# Patient Record
Sex: MALE | Race: WHITE
[De-identification: names, ages, dates, MRNs, and addresses within clinical notes are randomized per-mention and may not be internally consistent; named-entity substitution may affect disease eponyms.]

---

## 2016-05-24 VITALS — SYSTOLIC BLOOD PRESSURE: 167 MMHG | DIASTOLIC BLOOD PRESSURE: 88 MMHG

## 2019-10-16 ENCOUNTER — HOSPITAL ENCOUNTER (OUTPATIENT)
Dept: HOSPITAL 61 - RAD | Age: 54
Discharge: HOME | End: 2019-10-16
Attending: SURGERY
Payer: MEDICARE

## 2019-10-16 DIAGNOSIS — M47.812: ICD-10-CM

## 2019-10-16 DIAGNOSIS — M51.36: ICD-10-CM

## 2019-10-16 DIAGNOSIS — M43.16: ICD-10-CM

## 2019-10-16 DIAGNOSIS — M43.12: Primary | ICD-10-CM

## 2019-10-16 DIAGNOSIS — I65.23: ICD-10-CM

## 2019-10-16 PROCEDURE — 72040 X-RAY EXAM NECK SPINE 2-3 VW: CPT

## 2019-10-16 PROCEDURE — 72100 X-RAY EXAM L-S SPINE 2/3 VWS: CPT

## 2019-10-16 NOTE — RAD
LUMBAR SPINE 2-3V

 

History: Back and neck pain

 

Comparison: None.

 

Findings:

2 views of the lumbar spine are submitted. There has been posterolateral 

fusion with bilateral pedicle screws at what is considered L3, L4, L5, 

apparently transitional anatomy of the lumbar spine. There is grade 1 

anterior spondylolisthesis at L4-5. There is multilevel lumbar 

degenerative disc disease greatest at L5-S1, L4-5, L2-3, L1-2. There is 

very mild grade 1 anterior spondylolisthesis L2-3, minimal posterior 

subluxation L1 relative to L2. There has been posterior decompression 

centered about what is considered the L4 level. Lumbar vertebral body 

stature is maintained.

 

IMPRESSION:

 

1. There is intact posterolateral fusion hardware at what is considered 

L3, L4, L5, posterior decompression centered at L4.

2. There is multilevel lumbar degenerative disc disease. There is mild 

alignment as stated.

 

Electronically signed by: Sky Hallman MD (10/16/2019 5:51 PM) 

[id number]

## 2019-10-16 NOTE — RAD
CERVICAL SPINE 2-3V

 

History: Neck pain

 

Comparison: None.

 

Findings:

2 views of the cervical spine are submitted. Cervical vertebral body 

stature is maintained. There is fairly advanced degenerative disc disease 

C6-7 and minimally at C5-6 with spondylosis at the same levels. There is 

negligible anterior spondylolisthesis at C3-4 and C4-C5. There is 

multilevel facet degenerative change greatest of mid cervical levels. 

There is atherosclerotic calcification of the left carotid artery in the 

neck.

 

Impression: 

 

1.  There is degenerative disc disease greatest at C6-7 and to lesser 

degree at C5-6 with spondylosis at these levels. There is facet 

degenerative change greatest of mid cervical levels, mild abnormal 

alignment as stated.

2. There is atherosclerotic calcification of the left carotid artery in 

the neck.

 

Electronically signed by: Sky Hallman MD (10/16/2019 5:54 PM) 

[facility]

## 2020-08-11 ENCOUNTER — APPOINTMENT (OUTPATIENT)
Dept: CT IMAGING | Facility: HOSPITAL | Age: 55
End: 2020-08-11

## 2020-08-11 ENCOUNTER — HOSPITAL ENCOUNTER (EMERGENCY)
Facility: HOSPITAL | Age: 55
Discharge: HOME | End: 2020-08-11
Attending: EMERGENCY MEDICINE | Admitting: EMERGENCY MEDICINE

## 2020-08-11 VITALS
HEART RATE: 88 BPM | RESPIRATION RATE: 18 BRPM | OXYGEN SATURATION: 95 % | BODY MASS INDEX: 32.58 KG/M2 | HEIGHT: 69 IN | DIASTOLIC BLOOD PRESSURE: 91 MMHG | TEMPERATURE: 98 F | WEIGHT: 220 LBS | SYSTOLIC BLOOD PRESSURE: 174 MMHG
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
## 2020-08-11 DIAGNOSIS — S16.1XXA STRAIN OF NECK MUSCLE, INITIAL ENCOUNTER: ICD-10-CM

## 2020-08-11 PROCEDURE — 72125 CT NECK SPINE W/O DYE: CPT | Performed by: RADIOLOGY

## 2020-08-11 PROCEDURE — 99283 EMERGENCY DEPT VISIT LOW MDM: CPT

## 2020-08-11 PROCEDURE — 70450 CT HEAD/BRAIN W/O DYE: CPT | Performed by: RADIOLOGY

## 2020-08-11 PROCEDURE — 25010000002 KETOROLAC TROMETHAMINE PER 15 MG: Performed by: PHYSICIAN ASSISTANT

## 2020-08-11 PROCEDURE — 96372 THER/PROPH/DIAG INJ SC/IM: CPT

## 2020-08-11 PROCEDURE — 72125 CT NECK SPINE W/O DYE: CPT

## 2020-08-11 PROCEDURE — 70450 CT HEAD/BRAIN W/O DYE: CPT

## 2020-08-11 RX ORDER — ORPHENADRINE CITRATE 100 MG/1
100 TABLET, EXTENDED RELEASE ORAL 2 TIMES DAILY PRN
Qty: 20 TABLET | Refills: 0 | Status: SHIPPED | OUTPATIENT
Start: 2020-08-11

## 2020-08-11 RX ORDER — DICLOFENAC SODIUM 75 MG/1
75 TABLET, DELAYED RELEASE ORAL 2 TIMES DAILY
Qty: 30 TABLET | Refills: 0 | Status: SHIPPED | OUTPATIENT
Start: 2020-08-11

## 2020-08-11 RX ORDER — KETOROLAC TROMETHAMINE 30 MG/ML
60 INJECTION, SOLUTION INTRAMUSCULAR; INTRAVENOUS ONCE
Status: DISCONTINUED | OUTPATIENT
Start: 2020-08-11 | End: 2020-08-11 | Stop reason: HOSPADM

## 2020-08-11 RX ADMIN — KETOROLAC TROMETHAMINE 60 MG: 60 INJECTION, SOLUTION INTRAMUSCULAR at 18:10
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
